# Patient Record
(demographics unavailable — no encounter records)

---

## 2024-11-13 NOTE — HISTORY OF PRESENT ILLNESS
[de-identified] : Patient comes for follow-up with overall no changes in exertional or functional ability.  Continues to take the same medications.  Recently had TURP procedure done because of Tronic prostatitis with infection.  Complicated by bladder hematoma and hemorrhage causing obstruction with bladder irrigation that was needed.  Bleeding site cauterized.  Currently without any ongoing symptoms.